# Patient Record
Sex: MALE | Race: WHITE | NOT HISPANIC OR LATINO | URBAN - METROPOLITAN AREA
[De-identification: names, ages, dates, MRNs, and addresses within clinical notes are randomized per-mention and may not be internally consistent; named-entity substitution may affect disease eponyms.]

---

## 2023-12-19 ENCOUNTER — APPOINTMENT (OUTPATIENT)
Dept: RADIOLOGY | Facility: CLINIC | Age: 28
End: 2023-12-19

## 2023-12-19 ENCOUNTER — OFFICE VISIT (OUTPATIENT)
Dept: URGENT CARE | Facility: CLINIC | Age: 28
End: 2023-12-19

## 2023-12-19 VITALS
SYSTOLIC BLOOD PRESSURE: 123 MMHG | RESPIRATION RATE: 18 BRPM | DIASTOLIC BLOOD PRESSURE: 79 MMHG | OXYGEN SATURATION: 97 % | TEMPERATURE: 98.2 F | WEIGHT: 170 LBS | HEART RATE: 93 BPM | BODY MASS INDEX: 27.32 KG/M2 | HEIGHT: 66 IN

## 2023-12-19 DIAGNOSIS — M79.671 RIGHT FOOT PAIN: ICD-10-CM

## 2023-12-19 DIAGNOSIS — M79.671 RIGHT FOOT PAIN: Primary | ICD-10-CM

## 2023-12-19 DIAGNOSIS — M25.562 ACUTE PAIN OF LEFT KNEE: ICD-10-CM

## 2023-12-19 PROCEDURE — 73630 X-RAY EXAM OF FOOT: CPT

## 2023-12-19 PROCEDURE — 73564 X-RAY EXAM KNEE 4 OR MORE: CPT

## 2023-12-19 PROCEDURE — G0383 LEV 4 HOSP TYPE B ED VISIT: HCPCS

## 2023-12-19 NOTE — PROGRESS NOTES
[facility]        NAME: Titus Moe is a 28 y.o. male  : 1995    MRN: [id number]  DATE: 2023  TIME: 7:09 PM    Assessment and Plan   Right foot pain [M79.671]  1. Right foot pain  XR foot 3+ vw right    Ambulatory Referral to Podiatry      2. Acute pain of left knee  XR knee 4+ vw left injury        Discussed problem with patient.  X-rays performed today revealed no acute abnormalities but awaiting official radiology interpretation.  Discussed the good heel support and advised to either will get in new insoles or get new shoes as the patient is constantly on his feet.  Placing referral to podiatry for follow-up if no improvement.  Discussed Tylenol and Motrin dosing.  Should also be using ice for inflammatory component.  Follow-up with PCP if symptoms not improving.    Patient Instructions       Follow up with PCP in 3-5 days.  Proceed to  ER if symptoms worsen.    Chief Complaint     Chief Complaint   Patient presents with   • Foot Pain     Right foot pain to plantar aspect. Patient states feels like nerves for weeks    • Knee Pain     Left knee pain with swelling behind knee cap           History of Present Illness       Right foot pain to plantar aspect. Patient states feels like nerves for weeks. Started two weeks ago, gradually getting worse. Worse with weight bearing, radiates to ankle. Ocassional shooting pains. Using occasional motrin 250 mg.    Left knee pain with swelling behind knee cap. Started last night, tight and burning sensation. Somewhat getting better.     Knee Pain         Review of Systems   Review of Systems   Constitutional:  Negative for appetite change, chills, fatigue and fever.   Respiratory:  Negative for cough, shortness of breath, wheezing and stridor.    Cardiovascular:  Negative for chest pain and palpitations.   Musculoskeletal:  Positive for gait problem and joint swelling.         Current Medications     No current outpatient medications on  "file.    Current Allergies     Allergies as of 12/19/2023   • (No Known Allergies)            The following portions of the patient's history were reviewed and updated as appropriate: allergies, current medications, past family history, past medical history, past social history, past surgical history and problem list.     History reviewed. No pertinent past medical history.    History reviewed. No pertinent surgical history.    History reviewed. No pertinent family history.      Medications have been verified.        Objective   /79   Pulse 93   Temp 98.2 °F (36.8 °C) (Tympanic)   Resp 18   Ht 5' 6\" (1.676 m)   Wt 77.1 kg (170 lb)   SpO2 97%   BMI 27.44 kg/m²        Physical Exam     Physical Exam  Vitals and nursing note reviewed.   Constitutional:       General: He is not in acute distress.     Appearance: Normal appearance. He is normal weight. He is not ill-appearing, toxic-appearing or diaphoretic.   HENT:      Head: Normocephalic.   Cardiovascular:      Rate and Rhythm: Normal rate and regular rhythm.      Pulses: Normal pulses.      Heart sounds: Normal heart sounds. No murmur heard.     No friction rub. No gallop.   Pulmonary:      Effort: Pulmonary effort is normal. No respiratory distress.      Breath sounds: Normal breath sounds. No stridor. No wheezing, rhonchi or rales.   Chest:      Chest wall: No tenderness.   Musculoskeletal:      Left knee: No swelling, deformity, effusion, erythema, ecchymosis, lacerations or bony tenderness. Normal range of motion. Tenderness (Tenderness to posterior hamstring insertion points) present. No LCL laxity, MCL laxity, ACL laxity or PCL laxity.Normal pulse.      Right foot: Normal range of motion. Tenderness present. No swelling, deformity or bony tenderness.      Left foot: Normal.   Neurological:      Mental Status: He is alert.                   "

## 2023-12-20 NOTE — PATIENT INSTRUCTIONS
Pain control recommendations include Tylenol 1000 mg combined with ibuprofen 600 or 800 mg 3 times a day which are shown to outperform opiate pain medication.